# Patient Record
Sex: MALE | Race: WHITE | NOT HISPANIC OR LATINO | URBAN - METROPOLITAN AREA
[De-identification: names, ages, dates, MRNs, and addresses within clinical notes are randomized per-mention and may not be internally consistent; named-entity substitution may affect disease eponyms.]

---

## 2017-01-26 NOTE — PATIENT DISCUSSION
Discussed use of [employer] once a day. Call if changes occur or if increased distortions are noted.

## 2017-01-26 NOTE — PATIENT DISCUSSION
No NEW retinal tears or retinal detachment seen on clinical exam today. Reviewed the signs and symptoms of retinal tear/retinal detachment and the importance of calling for prompt evaluation should there be increasing floaters, new flashing lights, or decreasing peripheral vision in either eye at any time. Observation recommended.

## 2021-07-29 ENCOUNTER — NEW PATIENT ROUTINE/VISION (OUTPATIENT)
Dept: URBAN - METROPOLITAN AREA CLINIC 50 | Facility: CLINIC | Age: 56
End: 2021-07-29

## 2021-07-29 DIAGNOSIS — H40.023: ICD-10-CM

## 2021-07-29 DIAGNOSIS — Z01.00: ICD-10-CM

## 2021-07-29 DIAGNOSIS — H52.4: ICD-10-CM

## 2021-07-29 DIAGNOSIS — H43.812: ICD-10-CM

## 2021-07-29 DIAGNOSIS — B39.9: ICD-10-CM

## 2021-07-29 PROCEDURE — 92015 DETERMINE REFRACTIVE STATE: CPT

## 2021-07-29 PROCEDURE — 92004 COMPRE OPH EXAM NEW PT 1/>: CPT

## 2021-07-29 PROCEDURE — 92134 CPTRZ OPH DX IMG PST SGM RTA: CPT

## 2021-07-29 ASSESSMENT — VISUAL ACUITY
OU_SC: 20/40
OS_CC: J1+
OU_CC: J1+
OS_CC: 20/20-2
OD_CC: 20/20
OS_SC: 20/60
OD_SC: 20/40
OD_CC: J1+
OU_CC: 20/20

## 2021-07-29 ASSESSMENT — KERATOMETRY
OD_K1POWER_DIOPTERS: 43.25
OD_AXISANGLE2_DEGREES: 173
OD_K2POWER_DIOPTERS: 43.75
OS_AXISANGLE2_DEGREES: 167
OS_K2POWER_DIOPTERS: 43.50
OS_K1POWER_DIOPTERS: 43.00
OD_AXISANGLE_DEGREES: 083
OS_AXISANGLE_DEGREES: 077

## 2021-07-29 ASSESSMENT — TONOMETRY
OD_IOP_MMHG: 14
OS_IOP_MMHG: 14

## 2021-07-29 NOTE — PATIENT DISCUSSION
Family history of glaucoma (grandfather). The IOP is in the target range. Due to increased C/D will order HVF SYLVIA standard, OCT ([facility]), and pachymetry. Will see patient back in 6 months for IOP check. Will continue to monitor.

## 2021-07-29 NOTE — PATIENT DISCUSSION
Histoplasmosis, OD. Patient stated while living in [place] he was cleaning child's toy under a tree with a lot of bird droppings. Water and wind blew into his eye, resulting in Histoplasmosis. Required laser on retina due to increased blood vessel growth. Had one flare up since then. Will continue to monitor.